# Patient Record
Sex: FEMALE | Race: WHITE | HISPANIC OR LATINO
[De-identification: names, ages, dates, MRNs, and addresses within clinical notes are randomized per-mention and may not be internally consistent; named-entity substitution may affect disease eponyms.]

---

## 2018-02-06 ENCOUNTER — TRANSCRIPTION ENCOUNTER (OUTPATIENT)
Age: 58
End: 2018-02-06

## 2018-02-12 ENCOUNTER — INPATIENT (INPATIENT)
Facility: HOSPITAL | Age: 58
LOS: 3 days | Discharge: HOME | End: 2018-02-16
Attending: INTERNAL MEDICINE | Admitting: INTERNAL MEDICINE
Payer: MEDICAID

## 2018-02-12 VITALS — WEIGHT: 169.98 LBS | HEIGHT: 65 IN

## 2018-02-12 LAB
ADD ON TEST-SPECIMEN IN LAB: SIGNIFICANT CHANGE UP
ANION GAP SERPL CALC-SCNC: 5 MMOL/L — SIGNIFICANT CHANGE UP (ref 5–17)
APTT BLD: 48.7 SEC — HIGH (ref 27.5–37.4)
BASOPHILS # BLD AUTO: 0 K/UL — SIGNIFICANT CHANGE UP (ref 0–0.2)
BASOPHILS NFR BLD AUTO: 0.7 % — SIGNIFICANT CHANGE UP (ref 0–2)
BUN SERPL-MCNC: 12 MG/DL — SIGNIFICANT CHANGE UP (ref 7–23)
CALCIUM SERPL-MCNC: 9.2 MG/DL — SIGNIFICANT CHANGE UP (ref 8.5–10.1)
CHLORIDE SERPL-SCNC: 104 MMOL/L — SIGNIFICANT CHANGE UP (ref 96–108)
CO2 SERPL-SCNC: 29 MMOL/L — SIGNIFICANT CHANGE UP (ref 22–31)
CREAT SERPL-MCNC: 0.91 MG/DL — SIGNIFICANT CHANGE UP (ref 0.5–1.3)
D DIMER BLD IA.RAPID-MCNC: 162 NG/ML DDU — SIGNIFICANT CHANGE UP
EOSINOPHIL # BLD AUTO: 0.1 K/UL — SIGNIFICANT CHANGE UP (ref 0–0.5)
EOSINOPHIL NFR BLD AUTO: 0.9 % — SIGNIFICANT CHANGE UP (ref 0–6)
GLUCOSE SERPL-MCNC: 101 MG/DL — HIGH (ref 70–99)
HCT VFR BLD CALC: 41 % — SIGNIFICANT CHANGE UP (ref 34.5–45)
HGB BLD-MCNC: 14 G/DL — SIGNIFICANT CHANGE UP (ref 11.5–15.5)
INR BLD: 1.35 RATIO — HIGH (ref 0.88–1.16)
LACTATE SERPL-SCNC: 1.3 MMOL/L — SIGNIFICANT CHANGE UP (ref 0.7–2)
LYMPHOCYTES # BLD AUTO: 1.6 K/UL — SIGNIFICANT CHANGE UP (ref 1–3.3)
LYMPHOCYTES # BLD AUTO: 25.6 % — SIGNIFICANT CHANGE UP (ref 13–44)
MCHC RBC-ENTMCNC: 28.2 PG — SIGNIFICANT CHANGE UP (ref 27–34)
MCHC RBC-ENTMCNC: 34.1 GM/DL — SIGNIFICANT CHANGE UP (ref 32–36)
MCV RBC AUTO: 82.7 FL — SIGNIFICANT CHANGE UP (ref 80–100)
MONOCYTES # BLD AUTO: 0.4 K/UL — SIGNIFICANT CHANGE UP (ref 0–0.9)
MONOCYTES NFR BLD AUTO: 5.9 % — SIGNIFICANT CHANGE UP (ref 2–14)
NEUTROPHILS # BLD AUTO: 4.1 K/UL — SIGNIFICANT CHANGE UP (ref 1.8–7.4)
NEUTROPHILS NFR BLD AUTO: 67 % — SIGNIFICANT CHANGE UP (ref 43–77)
PLATELET # BLD AUTO: 311 K/UL — SIGNIFICANT CHANGE UP (ref 150–400)
POTASSIUM SERPL-MCNC: 3.6 MMOL/L — SIGNIFICANT CHANGE UP (ref 3.5–5.3)
POTASSIUM SERPL-SCNC: 3.6 MMOL/L — SIGNIFICANT CHANGE UP (ref 3.5–5.3)
PROTHROM AB SERPL-ACNC: 14.7 SEC — HIGH (ref 9.8–12.7)
RBC # BLD: 4.95 M/UL — SIGNIFICANT CHANGE UP (ref 3.8–5.2)
RBC # FLD: 10.9 % — SIGNIFICANT CHANGE UP (ref 10.3–14.5)
SODIUM SERPL-SCNC: 138 MMOL/L — SIGNIFICANT CHANGE UP (ref 135–145)
WBC # BLD: 6.1 K/UL — SIGNIFICANT CHANGE UP (ref 3.8–10.5)
WBC # FLD AUTO: 6.1 K/UL — SIGNIFICANT CHANGE UP (ref 3.8–10.5)

## 2018-02-12 PROCEDURE — 93010 ELECTROCARDIOGRAM REPORT: CPT

## 2018-02-12 PROCEDURE — 99285 EMERGENCY DEPT VISIT HI MDM: CPT

## 2018-02-12 PROCEDURE — 71046 X-RAY EXAM CHEST 2 VIEWS: CPT | Mod: 26

## 2018-02-12 RX ORDER — SODIUM CHLORIDE 9 MG/ML
1000 INJECTION INTRAMUSCULAR; INTRAVENOUS; SUBCUTANEOUS ONCE
Qty: 0 | Refills: 0 | Status: COMPLETED | OUTPATIENT
Start: 2018-02-12 | End: 2018-02-12

## 2018-02-12 RX ORDER — PIPERACILLIN AND TAZOBACTAM 4; .5 G/20ML; G/20ML
3.38 INJECTION, POWDER, LYOPHILIZED, FOR SOLUTION INTRAVENOUS ONCE
Qty: 0 | Refills: 0 | Status: COMPLETED | OUTPATIENT
Start: 2018-02-12 | End: 2018-02-12

## 2018-02-12 RX ORDER — IPRATROPIUM/ALBUTEROL SULFATE 18-103MCG
3 AEROSOL WITH ADAPTER (GRAM) INHALATION ONCE
Qty: 0 | Refills: 0 | Status: COMPLETED | OUTPATIENT
Start: 2018-02-12 | End: 2018-02-12

## 2018-02-12 RX ORDER — CEFTRIAXONE 500 MG/1
1000 INJECTION, POWDER, FOR SOLUTION INTRAMUSCULAR; INTRAVENOUS EVERY 24 HOURS
Qty: 0 | Refills: 0 | Status: DISCONTINUED | OUTPATIENT
Start: 2018-02-12 | End: 2018-02-16

## 2018-02-12 RX ORDER — MAGNESIUM SULFATE 500 MG/ML
2 VIAL (ML) INJECTION ONCE
Qty: 0 | Refills: 0 | Status: COMPLETED | OUTPATIENT
Start: 2018-02-12 | End: 2018-02-12

## 2018-02-12 RX ORDER — AZITHROMYCIN 500 MG/1
500 TABLET, FILM COATED ORAL EVERY 24 HOURS
Qty: 0 | Refills: 0 | Status: DISCONTINUED | OUTPATIENT
Start: 2018-02-12 | End: 2018-02-16

## 2018-02-12 RX ORDER — CEFTRIAXONE 500 MG/1
1 INJECTION, POWDER, FOR SOLUTION INTRAMUSCULAR; INTRAVENOUS EVERY 24 HOURS
Qty: 0 | Refills: 0 | Status: DISCONTINUED | OUTPATIENT
Start: 2018-02-12 | End: 2018-02-12

## 2018-02-12 RX ORDER — SODIUM CHLORIDE 9 MG/ML
1000 INJECTION INTRAMUSCULAR; INTRAVENOUS; SUBCUTANEOUS
Qty: 0 | Refills: 0 | Status: DISCONTINUED | OUTPATIENT
Start: 2018-02-12 | End: 2018-02-13

## 2018-02-12 RX ORDER — ACETAMINOPHEN 500 MG
650 TABLET ORAL EVERY 6 HOURS
Qty: 0 | Refills: 0 | Status: DISCONTINUED | OUTPATIENT
Start: 2018-02-12 | End: 2018-02-16

## 2018-02-12 RX ORDER — CIPROFLOXACIN LACTATE 400MG/40ML
400 VIAL (ML) INTRAVENOUS ONCE
Qty: 0 | Refills: 0 | Status: COMPLETED | OUTPATIENT
Start: 2018-02-12 | End: 2018-02-12

## 2018-02-12 RX ORDER — ONDANSETRON 8 MG/1
4 TABLET, FILM COATED ORAL EVERY 6 HOURS
Qty: 0 | Refills: 0 | Status: DISCONTINUED | OUTPATIENT
Start: 2018-02-12 | End: 2018-02-16

## 2018-02-12 RX ADMIN — PIPERACILLIN AND TAZOBACTAM 200 GRAM(S): 4; .5 INJECTION, POWDER, LYOPHILIZED, FOR SOLUTION INTRAVENOUS at 20:45

## 2018-02-12 RX ADMIN — Medication 50 GRAM(S): at 22:45

## 2018-02-12 RX ADMIN — Medication 3 MILLILITER(S): at 20:02

## 2018-02-12 RX ADMIN — Medication 3 MILLILITER(S): at 21:40

## 2018-02-12 RX ADMIN — Medication 200 MILLIGRAM(S): at 21:00

## 2018-02-12 RX ADMIN — Medication 125 MILLIGRAM(S): at 21:30

## 2018-02-12 RX ADMIN — SODIUM CHLORIDE 1000 MILLILITER(S): 9 INJECTION INTRAMUSCULAR; INTRAVENOUS; SUBCUTANEOUS at 23:30

## 2018-02-12 RX ADMIN — Medication 3 MILLILITER(S): at 21:30

## 2018-02-12 RX ADMIN — SODIUM CHLORIDE 1000 MILLILITER(S): 9 INJECTION INTRAMUSCULAR; INTRAVENOUS; SUBCUTANEOUS at 20:06

## 2018-02-12 NOTE — H&P ADULT - NSHPPHYSICALEXAM_GEN_ALL_CORE
Vital Signs Last 24 Hrs  T(C): 36.9 (12 Feb 2018 18:36), Max: 36.9 (12 Feb 2018 18:36)  T(F): 98.4 (12 Feb 2018 18:36), Max: 98.4 (12 Feb 2018 18:36)  HR: 101 (12 Feb 2018 22:00) (101 - 106)  BP: 93/44 (12 Feb 2018 22:00) (93/44 - 115/72)  BP(mean): --  RR: 20 (12 Feb 2018 18:36) (20 - 20)  SpO2: 100% (12 Feb 2018 18:36) (100% - 100%)    GEN: appears ill  Neuro: AAOx3, nonfocal  HEENT: NC/AT, EOMI  Neck: no thyroidmegaly, no JVD  Cardiovascular: S1S2 present, regular rhythm, no murmur, tachycardia, BP 93/44  Respiratory: breath sounds normal bilaterally, no wheezing, no rales, no rhonchi  Gastrointestinal: bowel sounds normal, soft, no abdominal tenderness  Musculoskeletal: no muscle tenderness  Extremities: No edema  Skin: No rash Vital Signs Last 24 Hrs  T(C): 36.9 (12 Feb 2018 18:36), Max: 36.9 (12 Feb 2018 18:36)  T(F): 98.4 (12 Feb 2018 18:36), Max: 98.4 (12 Feb 2018 18:36)  HR: 101 (12 Feb 2018 22:00) (101 - 106)  BP: 93/44 (12 Feb 2018 22:00) (93/44 - 115/72)  BP(mean): --  RR: 20 (12 Feb 2018 18:36) (20 - 20)  SpO2: 100% (12 Feb 2018 18:36) (100% - 100%)    GEN: appears ill  Neuro: AAOx3, nonfocal  HEENT: NC/AT, EOMI  Neck: no thyroidmegaly, no JVD  Cardiovascular: S1S2 present, regular rhythm, no murmur, tachycardia, BP 93/44  Respiratory: breath sounds normal bilaterally, no wheezing, no rales, no rhonchi  Gastrointestinal: bowel sounds normal, soft, no abdominal tenderness  Musculoskeletal: no muscle tenderness  Extremities: trace edema  Skin: No rash

## 2018-02-12 NOTE — H&P ADULT - HISTORY OF PRESENT ILLNESS
57 y.o. female with PMH asthma presents with generalized malaise, fever, cough. Pt reports symptoms started 15 days ago where she was initially diagnosed with flu and given tamiflu. Pt reports worsening symptoms and went to urgent care and was told of pneumonia. Pt took levaquin starting 4 days ago and her symptoms didn't improve. Pt reports dry cough and shortness of breath. Reports on and off fever and chills. States her nephew was sick few weeks back. States yesterday, Tmax 100.8. No recent travel.    PMH: as above  PSH: c section, L ovary surgery  Social hx: denies tobacco, EtOH, or drugs  Family hx: Mother-uterine ca; Father-prostate ca; Sister-breast ca  ROS: per hPI

## 2018-02-12 NOTE — ED ADULT TRIAGE NOTE - CHIEF COMPLAINT QUOTE
diagnosed with flu and pna ,was given Levaquin and bromide cough syrup , states medications not working

## 2018-02-12 NOTE — ED STATDOCS - ATTENDING CONTRIBUTION TO CARE
I, Matt Biswas MD,  performed the initial face to face bedside interview with this patient regarding history of present illness, review of symptoms and relevant past medical, social and family history.  I completed an independent physical examination.  I was the initial provider who evaluated this patient. I have signed out the follow up of any pending tests (i.e. labs, radiological studies) to the ACP.  I have communicated the patient’s plan of care and disposition with the ACP.

## 2018-02-12 NOTE — ED STATDOCS - PROGRESS NOTE DETAILS
signed Katja Shearer PA-C Pt seen initially in intake by Dr Biswas. signed Katja Shearer PA-C Pt seen initially in intake by Dr Biswas.  Pt c/o worsening cough and ECHOLS, dx with PNA 1 week ago, started on levaquin. Pt saw PMD 2 weeks ago, was given tamiflu. Pt states she is feeling worse. Plan labs, xray, nebulizer, reassess. possible admission. Pt agrees with plan of  care. signed Katja Shearer PA-C Pt without any improvement after initial neb tx. No change in pulm exam. Pt ill appearing. No significant findings on labwork. Plan additional neb txs, solumedrol, IV magnesium. If no improvement plan admission. Pt agrees with plan of  care. signed Katja Shearer PA-C   Pt not feeling better after second treatments. Plan admission for dypsnea and bronchitis not improving on outpt abx. pt ill-appearing, becomes very dyspneic on exertion, does not feel comfortable going home. Pt agrees with admission and plan of care.   Spoke to Dr rIizarry, accepts pt, requests RVP and D Dimer.

## 2018-02-12 NOTE — ED STATDOCS - OBJECTIVE STATEMENT
56 y/o female with no significant PMHx presents to the ED c/o cough, fever, mild SOB x15 days. Pt was told by PMD that she had the flu and was given Tamiflu 2 weeks ago. On Wednesday (5 days ago) pt was seen at clinic and had an XR which showed pneumonia, was given Levaquin and antitussive medicine. +Posttussive vomiting. +Chills. +Sweats. Pt has not taken anything to treat fevers. 's family is sick, they are living in the same house. Allergic to ASA, ibuprofen. PMD Dr. Armendariz.

## 2018-02-12 NOTE — H&P ADULT - ASSESSMENT
57 y.o. female with PMH asthma presents with generalized malaise, fever, cough. Pt reports symptoms started 15 days ago where she was initially diagnosed with flu and given tamiflu. Pt reports worsening symptoms and went to urgent care and was told of pneumonia. Pt took levaquin starting 4 days ago and her symptoms didn't improve. Pt reports dry cough and shortness of breath. Reports on and off fever and chills. States her nephew was sick few weeks back. States yesterday, Tmax 100.8. No recent travel.    #malaise, fever, tachycardia due to sepsis likely viral infection; bronchitis vs PNA  #hypotension (93/44)  -d dimer noted: 162  -admit to med surg  -iv hydration  -azithro, ceftriaxone  -f/u cultures  -check RVP  -supportive care  -check CT chest w/o    #shortness of breath with hx asthma, exacerbation  -ED documented b/l rhonchi. Currently, clear lungs  -pt received nebs, steroids, magnesium sulfate in ED  -cont steroids  -supplemental oxygen PRN  -prn albuterol  -pulm consult    #DVT ppx  -SCDs

## 2018-02-12 NOTE — ED ADULT NURSE NOTE - OBJECTIVE STATEMENT
Pt seen by PMD and has been taking 2-3 weeks of levaquin and cough medicine, +cough, dry nonproductive "but I still cough even with the cough medicine", reports feeling worse despite PO abx (levaquin). Per  and pt, has confirmed pna seen on CXR output

## 2018-02-13 DIAGNOSIS — J18.9 PNEUMONIA, UNSPECIFIED ORGANISM: ICD-10-CM

## 2018-02-13 DIAGNOSIS — J45.901 UNSPECIFIED ASTHMA WITH (ACUTE) EXACERBATION: ICD-10-CM

## 2018-02-13 DIAGNOSIS — Z87.09 PERSONAL HISTORY OF OTHER DISEASES OF THE RESPIRATORY SYSTEM: ICD-10-CM

## 2018-02-13 LAB
ANION GAP SERPL CALC-SCNC: 10 MMOL/L — SIGNIFICANT CHANGE UP (ref 5–17)
BASOPHILS # BLD AUTO: 0 K/UL — SIGNIFICANT CHANGE UP (ref 0–0.2)
BASOPHILS NFR BLD AUTO: 0.4 % — SIGNIFICANT CHANGE UP (ref 0–2)
BUN SERPL-MCNC: 10 MG/DL — SIGNIFICANT CHANGE UP (ref 7–23)
CALCIUM SERPL-MCNC: 8.5 MG/DL — SIGNIFICANT CHANGE UP (ref 8.5–10.1)
CHLORIDE SERPL-SCNC: 109 MMOL/L — HIGH (ref 96–108)
CO2 SERPL-SCNC: 21 MMOL/L — LOW (ref 22–31)
CREAT SERPL-MCNC: 0.77 MG/DL — SIGNIFICANT CHANGE UP (ref 0.5–1.3)
EOSINOPHIL # BLD AUTO: 0 K/UL — SIGNIFICANT CHANGE UP (ref 0–0.5)
EOSINOPHIL NFR BLD AUTO: 0.1 % — SIGNIFICANT CHANGE UP (ref 0–6)
GLUCOSE SERPL-MCNC: 165 MG/DL — HIGH (ref 70–99)
HCT VFR BLD CALC: 36.6 % — SIGNIFICANT CHANGE UP (ref 34.5–45)
HGB BLD-MCNC: 12.3 G/DL — SIGNIFICANT CHANGE UP (ref 11.5–15.5)
LYMPHOCYTES # BLD AUTO: 0.6 K/UL — LOW (ref 1–3.3)
LYMPHOCYTES # BLD AUTO: 10.3 % — LOW (ref 13–44)
MCHC RBC-ENTMCNC: 28.3 PG — SIGNIFICANT CHANGE UP (ref 27–34)
MCHC RBC-ENTMCNC: 33.5 GM/DL — SIGNIFICANT CHANGE UP (ref 32–36)
MCV RBC AUTO: 84.4 FL — SIGNIFICANT CHANGE UP (ref 80–100)
MONOCYTES # BLD AUTO: 0.1 K/UL — SIGNIFICANT CHANGE UP (ref 0–0.9)
MONOCYTES NFR BLD AUTO: 1.1 % — LOW (ref 2–14)
NEUTROPHILS # BLD AUTO: 4.8 K/UL — SIGNIFICANT CHANGE UP (ref 1.8–7.4)
NEUTROPHILS NFR BLD AUTO: 88.2 % — HIGH (ref 43–77)
PLATELET # BLD AUTO: 255 K/UL — SIGNIFICANT CHANGE UP (ref 150–400)
POTASSIUM SERPL-MCNC: 4 MMOL/L — SIGNIFICANT CHANGE UP (ref 3.5–5.3)
POTASSIUM SERPL-SCNC: 4 MMOL/L — SIGNIFICANT CHANGE UP (ref 3.5–5.3)
RAPID RVP RESULT: SIGNIFICANT CHANGE UP
RBC # BLD: 4.34 M/UL — SIGNIFICANT CHANGE UP (ref 3.8–5.2)
RBC # FLD: 11.5 % — SIGNIFICANT CHANGE UP (ref 10.3–14.5)
SODIUM SERPL-SCNC: 140 MMOL/L — SIGNIFICANT CHANGE UP (ref 135–145)
WBC # BLD: 5.4 K/UL — SIGNIFICANT CHANGE UP (ref 3.8–10.5)
WBC # FLD AUTO: 5.4 K/UL — SIGNIFICANT CHANGE UP (ref 3.8–10.5)

## 2018-02-13 PROCEDURE — 99255 IP/OBS CONSLTJ NEW/EST HI 80: CPT

## 2018-02-13 PROCEDURE — 71250 CT THORAX DX C-: CPT | Mod: 26

## 2018-02-13 RX ORDER — INFLUENZA VIRUS VACCINE 15; 15; 15; 15 UG/.5ML; UG/.5ML; UG/.5ML; UG/.5ML
0.5 SUSPENSION INTRAMUSCULAR ONCE
Qty: 0 | Refills: 0 | Status: COMPLETED | OUTPATIENT
Start: 2018-02-13 | End: 2018-02-16

## 2018-02-13 RX ORDER — ALBUTEROL 90 UG/1
1 AEROSOL, METERED ORAL EVERY 4 HOURS
Qty: 0 | Refills: 0 | Status: DISCONTINUED | OUTPATIENT
Start: 2018-02-13 | End: 2018-02-16

## 2018-02-13 RX ORDER — ALBUTEROL 90 UG/1
2.5 AEROSOL, METERED ORAL EVERY 6 HOURS
Qty: 0 | Refills: 0 | Status: DISCONTINUED | OUTPATIENT
Start: 2018-02-13 | End: 2018-02-16

## 2018-02-13 RX ADMIN — Medication 40 MILLIGRAM(S): at 05:45

## 2018-02-13 RX ADMIN — AZITHROMYCIN 255 MILLIGRAM(S): 500 TABLET, FILM COATED ORAL at 09:44

## 2018-02-13 RX ADMIN — ALBUTEROL 2.5 MILLIGRAM(S): 90 AEROSOL, METERED ORAL at 22:17

## 2018-02-13 RX ADMIN — Medication 40 MILLIGRAM(S): at 21:32

## 2018-02-13 RX ADMIN — CEFTRIAXONE 1000 MILLIGRAM(S): 500 INJECTION, POWDER, FOR SOLUTION INTRAMUSCULAR; INTRAVENOUS at 12:05

## 2018-02-13 RX ADMIN — SODIUM CHLORIDE 125 MILLILITER(S): 9 INJECTION INTRAMUSCULAR; INTRAVENOUS; SUBCUTANEOUS at 06:23

## 2018-02-13 RX ADMIN — Medication 40 MILLIGRAM(S): at 14:10

## 2018-02-13 NOTE — CONSULT NOTE ADULT - ASSESSMENT
Recent Flu.   Pneumonia L lung. CAP. IV Azithro, ceftriaxone. NC O2.   Asthma exacerbation. Improving. Nebs, IV solumedrol, can begin tapering in AM. O2 prn.

## 2018-02-13 NOTE — CONSULT NOTE ADULT - SUBJECTIVE AND OBJECTIVE BOX
HPI:  57 y.o. female with PMH asthma presents with generalized malaise, fever, cough. Pt reports symptoms started 15 days ago where she was initially diagnosed with flu and given tamiflu. Pt reports worsening symptoms and went to urgent care and was told of pneumonia. Pt took levaquin starting 4 days ago and her symptoms didn't improve. Pt reports dry cough and shortness of breath. Reports on and off fever and chills. States her nephew was sick few weeks back. States yesterday, Tmax 100.8. No recent travel.    CT scan of chest reviewed. Shows nodular/patchy findings L lung c/w PNA. Pt on IV azithro, ceftriaxone.      PMH: as above  PSH: c section, L ovary surgery  Social hx: denies tobacco, EtOH, or drugs  Family hx: Mother-uterine ca; Father-prostate ca; Sister-breast ca  ROS: per hPI (12 Feb 2018 23:43)      PAST MEDICAL & SURGICAL HISTORY:  Asthma due to environmental allergies  Lyme disease      MEDICATIONS  (STANDING):  ALBUTerol    90 MICROgram(s) HFA Inhaler 1 Puff(s) Inhalation every 4 hours  azithromycin  IVPB 500 milliGRAM(s) IV Intermittent every 24 hours  cefTRIAXone Injectable. 1000 milliGRAM(s) IV Push every 24 hours  influenza   Vaccine 0.5 milliLiter(s) IntraMuscular once  methylPREDNISolone sodium succinate Injectable 40 milliGRAM(s) IV Push every 8 hours    MEDICATIONS  (PRN):  acetaminophen   Tablet 650 milliGRAM(s) Oral every 6 hours PRN For Temp greater than 38 C (100.4 F), HA, mild pain  ALBUTerol    0.083% 2.5 milliGRAM(s) Nebulizer every 6 hours PRN Shortness of Breath and/or Wheezing  ondansetron Injectable 4 milliGRAM(s) IV Push every 6 hours PRN Nausea      Allergies    aspirin (Swelling (Mild to Mod); Flushing (Mild to Mod))    Intolerances        SOCIAL HISTORY: Denies tobacco, etoh abuse or illicit drug use    FAMILY HISTORY:      Vital Signs Last 24 Hrs  T(C): 37.1 (13 Feb 2018 12:00), Max: 37.2 (12 Feb 2018 23:00)  T(F): 98.7 (13 Feb 2018 12:00), Max: 98.9 (12 Feb 2018 23:00)  HR: 100 (13 Feb 2018 12:00) (88 - 106)  BP: 111/54 (13 Feb 2018 12:00) (91/48 - 126/63)  BP(mean): --  RR: 17 (13 Feb 2018 12:00) (17 - 20)  SpO2: 99% (13 Feb 2018 12:00) (97% - 100%)    REVIEW OF SYSTEMS:    CONSTITUTIONAL:  As per HPI.  HEENT:  Eyes:  No diplopia or blurred vision. ENT:  No earache, sore throat or runny nose.  CARDIOVASCULAR:  No pressure, squeezing, tightness, heaviness or aching about the chest, neck, axilla or epigastrium.  RESPIRATORY:  see above.  GASTROINTESTINAL:  No nausea, vomiting or diarrhea.  GENITOURINARY:  No dysuria, frequency or urgency.  MUSCULOSKELETAL:  As per HPI.  SKIN:  No change in skin, hair or nails.  NEUROLOGIC:  No paresthesias, fasciculations, seizures or weakness.  PSYCHIATRIC:  No disorder of thought or mood.  ENDOCRINE:  No heat or cold intolerance, polyuria or polydipsia.  HEMATOLOGICAL:  No easy bruising or bleedings:  .     PHYSICAL EXAMINATION:    GENERAL APPEARANCE:  Pt. is not currently dyspneic, in no distress. Pt. is alert, oriented, and pleasant.  HEENT:  Pupils are normal and react normally. No icterus. Mucous membranes well colored.  NECK:  Supple. No lymphadenopathy. Jugular venous pressure not elevated. Carotids equal.   HEART:   The cardiac impulse has a normal quality. Regular. Normal S1 and S2. There are no murmurs, rubs or gallops noted  CHEST:  Some decreased, but present, BS's aud bilat.   ABDOMEN:  Soft and nontender.   EXTREMITIES:  There is no cyanosis, clubbing or edema.   SKIN:  No rash or significant lesions are noted.  Neuro: Alert, awake, and O x 3.      LABS:                        12.3   5.4   )-----------( 255      ( 13 Feb 2018 06:32 )             36.6     02-13    140  |  109<H>  |  10  ----------------------------<  165<H>  4.0   |  21<L>  |  0.77    Ca    8.5      13 Feb 2018 06:32        PT/INR - ( 12 Feb 2018 19:59 )   PT: 14.7 sec;   INR: 1.35 ratio         PTT - ( 12 Feb 2018 19:59 )  PTT:48.7 sec            RADIOLOGY & ADDITIONAL STUDIES:

## 2018-02-13 NOTE — ED ADULT NURSE REASSESSMENT NOTE - NS ED NURSE REASSESS COMMENT FT1
Patient received from VIANNEY Forde. Patient resting comfortably in bed. Safety and comfort maintained. Will continue to monitor.

## 2018-02-14 RX ORDER — CODEINE PHOSPHATE/GUAIFENESIN
5 SYRUP ORAL EVERY 6 HOURS
Qty: 0 | Refills: 0 | Status: DISCONTINUED | OUTPATIENT
Start: 2018-02-14 | End: 2018-02-16

## 2018-02-14 RX ORDER — CODEINE SULFATE 60 MG/1
15 TABLET ORAL EVERY 6 HOURS
Qty: 0 | Refills: 0 | Status: DISCONTINUED | OUTPATIENT
Start: 2018-02-14 | End: 2018-02-14

## 2018-02-14 RX ADMIN — CEFTRIAXONE 1000 MILLIGRAM(S): 500 INJECTION, POWDER, FOR SOLUTION INTRAMUSCULAR; INTRAVENOUS at 12:58

## 2018-02-14 RX ADMIN — AZITHROMYCIN 255 MILLIGRAM(S): 500 TABLET, FILM COATED ORAL at 12:58

## 2018-02-14 RX ADMIN — Medication 40 MILLIGRAM(S): at 18:39

## 2018-02-14 RX ADMIN — ALBUTEROL 2.5 MILLIGRAM(S): 90 AEROSOL, METERED ORAL at 13:42

## 2018-02-14 RX ADMIN — Medication 40 MILLIGRAM(S): at 05:51

## 2018-02-14 RX ADMIN — Medication 100 MILLIGRAM(S): at 21:54

## 2018-02-15 RX ORDER — LACTOBACILLUS ACIDOPHILUS 100MM CELL
1 CAPSULE ORAL
Qty: 0 | Refills: 0 | Status: DISCONTINUED | OUTPATIENT
Start: 2018-02-15 | End: 2018-02-16

## 2018-02-15 RX ADMIN — CEFTRIAXONE 1000 MILLIGRAM(S): 500 INJECTION, POWDER, FOR SOLUTION INTRAMUSCULAR; INTRAVENOUS at 11:23

## 2018-02-15 RX ADMIN — Medication 40 MILLIGRAM(S): at 05:15

## 2018-02-15 RX ADMIN — Medication 100 MILLIGRAM(S): at 11:23

## 2018-02-15 RX ADMIN — Medication 1 TABLET(S): at 17:45

## 2018-02-15 RX ADMIN — AZITHROMYCIN 255 MILLIGRAM(S): 500 TABLET, FILM COATED ORAL at 10:06

## 2018-02-15 RX ADMIN — Medication 100 MILLIGRAM(S): at 17:45

## 2018-02-15 RX ADMIN — Medication 100 MILLIGRAM(S): at 05:15

## 2018-02-15 NOTE — PROGRESS NOTE ADULT - ASSESSMENT
57 y.o. female with PMH asthma presents with generalized malaise, fever, cough. Pt reports symptoms started 15 days ago where she was initially diagnosed with flu and given tamiflu. Pt reports worsening symptoms and went to urgent care and was told of pneumonia. Pt took levaquin starting 4 days ago and her symptoms didn't improve. Pt reports dry cough and shortness of breath. Reports on and off fever and chills. States her nephew was sick few weeks back. States yesterday, Tmax 100.8. No recent travel.    #malaise, fever, tachycardia due PNA  - rvp neg  - CT Chest : BRANDON PNA  -admit to med surg  - cw azithro, ceftriaxone  -f/u cultures  -supportive care  - pulm appreicated    #shortness of breath with hx asthma, exacerbation  -pt received nebs, steroids, magnesium sulfate in ED  -cont steroids- taper  -supplemental oxygen PRN  -prn albuterol    #DVT ppx  -SCDs
57 y.o. female with PMH asthma presents with generalized malaise, fever, cough. Pt reports symptoms started 15 days ago where she was initially diagnosed with flu and given tamiflu. Pt reports worsening symptoms and went to urgent care and was told of pneumonia. Pt took levaquin starting 4 days ago and her symptoms didn't improve. Pt reports dry cough and shortness of breath. Reports on and off fever and chills. States her nephew was sick few weeks back. States yesterday, Tmax 100.8. No recent travel.    #malaise, fever, tachycardia due PNA  - rvp neg  - CT Chest : BRANDON PNA  -admit to med surg  - cw azithro, ceftriaxone  -f/u cultures  -supportive care  - pulm appreicated    #shortness of breath with hx asthma, exacerbation  -pt received nebs, steroids, magnesium sulfate in ED  -cont steroids- taper  -supplemental oxygen PRN  -prn albuterol    #DVT ppx  -SCDs
57 y.o. female with PMH asthma presents with generalized malaise, fever, cough. Pt reports symptoms started 15 days ago where she was initially diagnosed with flu and given tamiflu. Pt reports worsening symptoms and went to urgent care and was told of pneumonia. Pt took levaquin starting 4 days ago and her symptoms didn't improve. Pt reports dry cough and shortness of breath. Reports on and off fever and chills. States her nephew was sick few weeks back. States yesterday, Tmax 100.8. No recent travel.    #malaise, fever, tachycardia due PNA  - rvp neg  - CT Chest : BRANDON PNA  -admit to med surg  - cw azithro, ceftriaxone  -f/u cultures  -supportive care  - pulm appreicated    #shortness of breath with hx asthma, exacerbation  -pt received nebs, steroids, magnesium sulfate in ED  -cont steroids- taper  -supplemental oxygen PRN  -prn albuterol    #DVT ppx  -SCDs    Dispo: Likely discharge tomorrow

## 2018-02-15 NOTE — PROGRESS NOTE ADULT - SUBJECTIVE AND OBJECTIVE BOX
CHIEF COMPLAINT: malaise, fever, cough    SUBJECTIVE: + dry cough. Not sob at rest presently. No chest pain.    REVIEW OF SYSTEMS: All other review of systems is negative unless indicated above    Vital Signs Last 24 Hrs  T(C): 37.1 (13 Feb 2018 12:00), Max: 37.2 (12 Feb 2018 23:00)  T(F): 98.7 (13 Feb 2018 12:00), Max: 98.9 (12 Feb 2018 23:00)  HR: 100 (13 Feb 2018 12:00) (88 - 102)  BP: 111/54 (13 Feb 2018 12:00) (91/48 - 126/63)  BP(mean): --  RR: 17 (13 Feb 2018 12:00) (17 - 19)  SpO2: 99% (13 Feb 2018 12:00) (97% - 99%)    PHYSICAL EXAM:  	GEN: appears ill  	Neuro: AAOx3, nonfocal  	HEENT: NC/AT, EOMI  	Neck: no thyroidmegaly, no JVD  	Cardiovascular: S1S2 present, regular rhythm, no murmur, tachycardia  	Respiratory: diminshed breath sounds diffusely. no wheezing, rales or rhonchi  	Gastrointestinal: bowel sounds normal, soft, no abdominal tenderness  	Musculoskeletal: no muscle tenderness  	Extremities: trace edema              Skin: No rash    MEDICATIONS:  MEDICATIONS  (STANDING):  ALBUTerol    90 MICROgram(s) HFA Inhaler 1 Puff(s) Inhalation every 4 hours  azithromycin  IVPB 500 milliGRAM(s) IV Intermittent every 24 hours  cefTRIAXone Injectable. 1000 milliGRAM(s) IV Push every 24 hours  influenza   Vaccine 0.5 milliLiter(s) IntraMuscular once  methylPREDNISolone sodium succinate Injectable 40 milliGRAM(s) IV Push every 8 hours    LABS: All Labs Reviewed:                        12.3   5.4   )-----------( 255      ( 13 Feb 2018 06:32 )             36.6     140  |  109<H>  |  10  ----------------------------<  165<H>  4.0   |  21<L>  |  0.77    Ca    8.5      13 Feb 2018 06:32    PT/INR - ( 12 Feb 2018 19:59 )   PT: 14.7 sec;   INR: 1.35 ratio    PTT - ( 12 Feb 2018 19:59 )  PTT:48.7 sec    < from: CT Chest No Cont (02.13.18 @ 00:43) >  IMPRESSION: Left upper lobe pneumonia.
CHIEF COMPLAINT: malaise, fever, cough    SUBJECTIVE: + dry cough. Not sob at rest presently. No chest pain.    REVIEW OF SYSTEMS: All other review of systems is negative unless indicated above    Vital Signs Last 24 Hrs  T(C): 37.2 (14 Feb 2018 13:12), Max: 37.2 (14 Feb 2018 13:12)  T(F): 98.9 (14 Feb 2018 13:12), Max: 98.9 (14 Feb 2018 13:12)  HR: 80 (14 Feb 2018 13:40) (63 - 88)  BP: 110/56 (14 Feb 2018 13:12) (99/51 - 110/56)  BP(mean): --  RR: 18 (14 Feb 2018 13:12) (18 - 18)  SpO2: 97% (14 Feb 2018 13:12) (93% - 99%)    PHYSICAL EXAM:  Constitutional: NAD, awake and alert  HEENT: EOMI, Normal Hearing, MMM  Neck: Soft and supple, No JVD  Respiratory: CTABL  Cardiovascular: S1 and S2, regular rate and rhythm, no Murmurs, gallops or rubs  Gastrointestinal: Bowel Sounds present, soft, nontender, nondistended, no guarding, no rebound  Extremities: No peripheral edema  Vascular: 2+ peripheral pulses  Neurological: A/O x 3, no focal deficits  Musculoskeletal: 5/5 strength b/l upper and lower extremities  Skin: No rashes    MEDICATIONS:  MEDICATIONS  (STANDING):  ALBUTerol    90 MICROgram(s) HFA Inhaler 1 Puff(s) Inhalation every 4 hours  azithromycin  IVPB 500 milliGRAM(s) IV Intermittent every 24 hours  cefTRIAXone Injectable. 1000 milliGRAM(s) IV Push every 24 hours  influenza   Vaccine 0.5 milliLiter(s) IntraMuscular once  methylPREDNISolone sodium succinate Injectable 40 milliGRAM(s) IV Push once    LABS: All Labs Reviewed:                        12.3   5.4   )-----------( 255      ( 13 Feb 2018 06:32 )             36.6     140  |  109<H>  |  10  ----------------------------<  165<H>  4.0   |  21<L>  |  0.77    Ca    8.5      13 Feb 2018 06:32    PT/INR - ( 12 Feb 2018 19:59 )   PT: 14.7 sec;   INR: 1.35 ratio    PTT - ( 12 Feb 2018 19:59 )  PTT:48.7 sec    Blood Culture: 02-12 @ 19:59  Organism --  Gram Stain Blood -- Gram Stain --  Specimen Source .Blood None  Culture-Blood --
CHIEF COMPLAINT: malaise, fever, cough    SUBJECTIVE: + dry cough. Not sob at rest presently. No chest pain. Does not feel ready to go home.    REVIEW OF SYSTEMS: All other review of systems is negative unless indicated above    Vital Signs Last 24 Hrs  T(C): 37.5 (15 Feb 2018 14:29), Max: 37.5 (15 Feb 2018 14:29)  T(F): 99.5 (15 Feb 2018 14:29), Max: 99.5 (15 Feb 2018 14:29)  HR: 85 (15 Feb 2018 14:29) (57 - 85)  BP: 109/63 (15 Feb 2018 14:29) (106/45 - 113/59)  BP(mean): --  RR: 18 (15 Feb 2018 14:29) (17 - 18)  SpO2: 99% (15 Feb 2018 14:29) (96% - 99%)    PHYSICAL EXAM:  Constitutional: NAD, awake and alert  HEENT: EOMI, Normal Hearing, MMM  Neck: Soft and supple, No JVD  Respiratory: CTABL  Cardiovascular: S1 and S2, regular rate and rhythm, no Murmurs, gallops or rubs  Gastrointestinal: Bowel Sounds present, soft, nontender, nondistended, no guarding, no rebound  Extremities: No peripheral edema  Vascular: 2+ peripheral pulses  Neurological: A/O x 3, no focal deficits  Musculoskeletal: 5/5 strength b/l upper and lower extremities  Skin: No rashes    MEDICATIONS:  MEDICATIONS  (STANDING):  ALBUTerol    90 MICROgram(s) HFA Inhaler 1 Puff(s) Inhalation every 4 hours  azithromycin  IVPB 500 milliGRAM(s) IV Intermittent every 24 hours  cefTRIAXone Injectable. 1000 milliGRAM(s) IV Push every 24 hours  influenza   Vaccine 0.5 milliLiter(s) IntraMuscular once  lactobacillus acidophilus 1 Tablet(s) Oral two times a day with meals  predniSONE   Tablet 40 milliGRAM(s) Oral daily    LABS: All Labs Reviewed:    Blood Culture: 02-12 @ 19:59  Organism --  Gram Stain Blood -- Gram Stain --  Specimen Source .Blood None  Culture-Blood --

## 2018-02-16 ENCOUNTER — TRANSCRIPTION ENCOUNTER (OUTPATIENT)
Age: 58
End: 2018-02-16

## 2018-02-16 VITALS
SYSTOLIC BLOOD PRESSURE: 110 MMHG | OXYGEN SATURATION: 98 % | RESPIRATION RATE: 18 BRPM | HEART RATE: 92 BPM | TEMPERATURE: 99 F | DIASTOLIC BLOOD PRESSURE: 64 MMHG

## 2018-02-16 RX ORDER — AZITHROMYCIN 500 MG/1
1 TABLET, FILM COATED ORAL
Qty: 2 | Refills: 0 | OUTPATIENT
Start: 2018-02-16 | End: 2018-02-17

## 2018-02-16 RX ORDER — LACTOBACILLUS ACIDOPHILUS 100MM CELL
1 CAPSULE ORAL
Qty: 14 | Refills: 0 | OUTPATIENT
Start: 2018-02-16 | End: 2018-02-22

## 2018-02-16 RX ORDER — FLUTICASONE PROPIONATE 50 MCG
1 SPRAY, SUSPENSION NASAL
Qty: 1 | Refills: 0 | OUTPATIENT
Start: 2018-02-16

## 2018-02-16 RX ORDER — ALBUTEROL 90 UG/1
3 AEROSOL, METERED ORAL
Qty: 30 | Refills: 0 | OUTPATIENT
Start: 2018-02-16

## 2018-02-16 RX ORDER — CEFUROXIME AXETIL 250 MG
2 TABLET ORAL
Qty: 20 | Refills: 0 | OUTPATIENT
Start: 2018-02-16 | End: 2018-02-20

## 2018-02-16 RX ADMIN — Medication 40 MILLIGRAM(S): at 05:12

## 2018-02-16 RX ADMIN — Medication 100 MILLIGRAM(S): at 05:15

## 2018-02-16 RX ADMIN — Medication 1 TABLET(S): at 09:40

## 2018-02-16 RX ADMIN — AZITHROMYCIN 255 MILLIGRAM(S): 500 TABLET, FILM COATED ORAL at 09:40

## 2018-02-16 RX ADMIN — CEFTRIAXONE 1000 MILLIGRAM(S): 500 INJECTION, POWDER, FOR SOLUTION INTRAMUSCULAR; INTRAVENOUS at 14:01

## 2018-02-16 RX ADMIN — INFLUENZA VIRUS VACCINE 0.5 MILLILITER(S): 15; 15; 15; 15 SUSPENSION INTRAMUSCULAR at 14:01

## 2018-02-16 NOTE — DISCHARGE NOTE ADULT - PLAN OF CARE
resolution complete course of antibiotics complete course of steroids  continue inhaler/nebulizer  follow up with your primary doctor

## 2018-02-16 NOTE — DISCHARGE NOTE ADULT - CARE PLAN
Principal Discharge DX:	Pneumonia  Goal:	resolution  Assessment and plan of treatment:	complete course of antibiotics  Secondary Diagnosis:	Asthma exacerbation  Assessment and plan of treatment:	complete course of steroids  continue inhaler/nebulizer  follow up with your primary doctor

## 2018-02-16 NOTE — DISCHARGE NOTE ADULT - PATIENT PORTAL LINK FT
You can access the [email or web address] Patient Portal, offered by [facility], by registering with the following website: [email or web address]

## 2018-02-16 NOTE — DISCHARGE NOTE ADULT - MEDICATION SUMMARY - MEDICATIONS TO TAKE
I will START or STAY ON the medications listed below when I get home from the hospital:    predniSONE 20 mg oral tablet  -- 2 tab(s) by mouth once a day  -- Indication: For Asthma     benzonatate 100 mg oral capsule  -- 1 cap(s) by mouth every 6 hours, As needed, Cough  -- Indication: For cough    albuterol 2.5 mg/3 mL (0.083%) inhalation solution  -- 3 milliliter(s) inhaled every 6 hours, As Needed   -- Indication: For Asthma    Ceftin 250 mg oral tablet  -- 2 tab(s) by mouth every 12 hours   -- Finish all this medication unless otherwise directed by prescriber.  Medication should be taken with plenty of water.  Take with food or milk.    -- Indication: For Pneumonia    azithromycin 500 mg oral tablet  -- 1 tab(s) by mouth once a day   -- Do not take dairy products, antacids, or iron preparations within one hour of this medication.  Finish all this medication unless otherwise directed by prescriber.    -- Indication: For Pneumonia    Flonase 50 mcg/inh nasal spray  -- 1 spray(s) intranasally 2 times a day, As Needed   -- For the nose.  It is very important that you take or use this exactly as directed.  Do not skip doses or discontinue unless directed by your doctor.    -- Indication: For sinusitis    lactobacillus acidophilus oral capsule  -- 1 cap(s) by mouth 2 times a day   -- Indication: For luz

## 2018-02-16 NOTE — DISCHARGE NOTE ADULT - HOSPITAL COURSE
CHIEF COMPLAINT: malaise, fever, cough  SUBJECTIVE: + dry cough. Not sob at rest presently. No chest pain.   REVIEW OF SYSTEMS: All other review of systems is negative unless indicated above    Vital Signs Last 24 Hrs  T(C): 37.1 (16 Feb 2018 14:27), Max: 37.1 (16 Feb 2018 14:27)  T(F): 98.8 (16 Feb 2018 14:27), Max: 98.8 (16 Feb 2018 14:27)  HR: 92 (16 Feb 2018 14:27) (69 - 92)  BP: 110/64 (16 Feb 2018 14:27) (90/74 - 112/43)  BP(mean): --  RR: 18 (16 Feb 2018 14:27) (18 - 18)  SpO2: 98% (16 Feb 2018 14:27) (97% - 98%)    PHYSICAL EXAM:  Constitutional: NAD, awake and alert  HEENT: EOMI, Normal Hearing, MMM  Neck: Soft and supple, No JVD  Respiratory: CTABL  Cardiovascular: S1 and S2, regular rate and rhythm, no Murmurs, gallops or rubs  Gastrointestinal: Bowel Sounds present, soft, nontender, nondistended, no guarding, no rebound  Extremities: No peripheral edema  Vascular: 2+ peripheral pulses  Neurological: A/O x 3, no focal deficits  Musculoskeletal: 5/5 strength b/l upper and lower extremities  Skin: No rashes    A/P  57 y.o. female with PMH asthma presents with generalized malaise, fever, cough. Pt reports symptoms started 15 days ago where she was initially diagnosed with flu and given tamiflu. Pt reports worsening symptoms and went to urgent care and was told of pneumonia. Pt took levaquin starting 4 days ago and her symptoms didn't improve. Pt reports dry cough and shortness of breath. Reports on and off fever and chills. States her nephew was sick few weeks back. States yesterday, Tmax 100.8. No recent travel.    #malaise, fever, tachycardia due PNA  - rvp neg  - CT Chest : BRANDON PNA  - azithro, ceftriaxone transitioned to ceftin and azithro at dc to complete course  - pulm appreicated    #shortness of breath with hx asthma, exacerbation  -pt received nebs, steroids, magnesium sulfate in ED  -complete steroid course  -prn albuterol  - follow up pmd    time spent on discharge 40 minutes

## 2018-02-18 LAB
CULTURE RESULTS: SIGNIFICANT CHANGE UP
CULTURE RESULTS: SIGNIFICANT CHANGE UP
SPECIMEN SOURCE: SIGNIFICANT CHANGE UP
SPECIMEN SOURCE: SIGNIFICANT CHANGE UP

## 2018-02-23 DIAGNOSIS — J18.9 PNEUMONIA, UNSPECIFIED ORGANISM: ICD-10-CM

## 2018-02-23 DIAGNOSIS — J45.901 UNSPECIFIED ASTHMA WITH (ACUTE) EXACERBATION: ICD-10-CM

## 2018-02-23 DIAGNOSIS — R00.0 TACHYCARDIA, UNSPECIFIED: ICD-10-CM

## 2018-02-26 ENCOUNTER — TRANSCRIPTION ENCOUNTER (OUTPATIENT)
Age: 58
End: 2018-02-26

## 2018-05-14 NOTE — ED STATDOCS - CHPI ED SYMPTOM POS
Progress Note - Urmila Adams 79 y o  female MRN: [id number]    Unit/Bed#: -01 Encounter: [id number]            Jojo Fong MD         acetaminophen    albuterol    bisacodyl    dextromethorphalbertoFENesin    ABDOMINAL DISTENTION/FEVER/+Mild SOB +Cough +Posttussive vomiting +Chills +Sweats 

## 2018-12-12 PROBLEM — J45.909 UNSPECIFIED ASTHMA, UNCOMPLICATED: Chronic | Status: ACTIVE | Noted: 2018-02-13

## 2018-12-12 PROBLEM — A69.20 LYME DISEASE, UNSPECIFIED: Chronic | Status: ACTIVE | Noted: 2018-02-13

## 2018-12-17 ENCOUNTER — APPOINTMENT (OUTPATIENT)
Dept: ORTHOPEDIC SURGERY | Facility: CLINIC | Age: 58
End: 2018-12-17
Payer: COMMERCIAL

## 2018-12-17 PROCEDURE — 99204 OFFICE O/P NEW MOD 45 MIN: CPT | Mod: 25

## 2018-12-17 PROCEDURE — 20610 DRAIN/INJ JOINT/BURSA W/O US: CPT | Mod: LT

## 2018-12-26 ENCOUNTER — FORM ENCOUNTER (OUTPATIENT)
Age: 58
End: 2018-12-26

## 2018-12-27 ENCOUNTER — OUTPATIENT (OUTPATIENT)
Dept: OUTPATIENT SERVICES | Facility: HOSPITAL | Age: 58
LOS: 1 days | End: 2018-12-27
Payer: COMMERCIAL

## 2018-12-27 ENCOUNTER — APPOINTMENT (OUTPATIENT)
Dept: MRI IMAGING | Facility: CLINIC | Age: 58
End: 2018-12-27
Payer: COMMERCIAL

## 2018-12-27 DIAGNOSIS — Z00.8 ENCOUNTER FOR OTHER GENERAL EXAMINATION: ICD-10-CM

## 2018-12-27 PROCEDURE — 73721 MRI JNT OF LWR EXTRE W/O DYE: CPT | Mod: 26,LT

## 2018-12-27 PROCEDURE — 73721 MRI JNT OF LWR EXTRE W/O DYE: CPT

## 2019-01-08 ENCOUNTER — APPOINTMENT (OUTPATIENT)
Dept: ORTHOPEDIC SURGERY | Facility: CLINIC | Age: 59
End: 2019-01-08
Payer: COMMERCIAL

## 2019-01-08 DIAGNOSIS — Z78.9 OTHER SPECIFIED HEALTH STATUS: ICD-10-CM

## 2019-01-08 DIAGNOSIS — M25.562 PAIN IN LEFT KNEE: ICD-10-CM

## 2019-01-08 DIAGNOSIS — Z87.39 PERSONAL HISTORY OF OTHER DISEASES OF THE MUSCULOSKELETAL SYSTEM AND CONNECTIVE TISSUE: ICD-10-CM

## 2019-01-08 DIAGNOSIS — Z87.09 PERSONAL HISTORY OF OTHER DISEASES OF THE RESPIRATORY SYSTEM: ICD-10-CM

## 2019-01-08 PROCEDURE — 99214 OFFICE O/P EST MOD 30 MIN: CPT

## 2019-01-08 NOTE — DISCUSSION/SUMMARY
[de-identified] : Assessment: Left knee medial meniscal tear with arthritis.\par \par Plan:\par She is feeling good today. She would like to go back to work. We gave her a work note to return to work tomorrow. We reviewed the MRI and answered all her questions. If the knee continues to bother her she will follow up in office and we'll discuss arthroscopy. For right now she is doing great and the cortisone injection helped her.  She can followup on an as-needed basis and we gave her a note to return to work no restrictions/full duty.

## 2019-01-08 NOTE — HISTORY OF PRESENT ILLNESS
[All Other ROS Normal] : All other review of systems are negative except as noted [Joint Pain] : joint pain [FreeTextEntry1] : Left knee pain. [FreeTextEntry2] : Ca returns to the office to review the MRI of her left knee. Since the cortisone injection last visit her pain scale has significantly dropped and she would like to go back to work after today's visit.  She denies locking or catching of the left knee. She denies numbness and tingling going down the left lower extremity. Her pain scale today and all this is a 2/10 which is significantly better than the last office visit.

## 2019-01-08 NOTE — PHYSICAL EXAM
[FreeTextEntry2] : Laterality: Left knee\par \par General: Alert and oriented x3.  In no acute distress.  Pleasant in nature with a normal affect.  No apparent respiratory distress. \par \par Erythema, Warmth, Rubor: Negative\par Swelling/Edema: Negative \par ROM: 0-120 degrees\par Lucila's Test: Positive\par Medial Joint Line TTP: Positive\par Lateral Joint Line TTP: Negative\par Lachman Exam/Anterior Drawer/Pivot Shift Test: Negative \par MCL Pain: Negative\par LCL Pain: Negative\par Iliotibial Band Pain: Negative\par Patellofemoral Joint Pain: Negative\par Pes Anserinus TTP: Negative\par Homans Sign: Negative\par Neurovascularly: Intact with no sensory or motor deficits\par  [de-identified] : MRI is positive for a medial meniscal tear, a proximal medial gastrocnemius tear, osteoarthritis.

## 2020-09-02 ENCOUNTER — TRANSCRIPTION ENCOUNTER (OUTPATIENT)
Age: 60
End: 2020-09-02

## 2021-09-20 ENCOUNTER — EMERGENCY (EMERGENCY)
Facility: HOSPITAL | Age: 61
LOS: 0 days | Discharge: HOME | End: 2021-09-20
Attending: EMERGENCY MEDICINE
Payer: OTHER MISCELLANEOUS

## 2021-09-20 VITALS
DIASTOLIC BLOOD PRESSURE: 61 MMHG | OXYGEN SATURATION: 100 % | TEMPERATURE: 98 F | HEART RATE: 75 BPM | RESPIRATION RATE: 18 BRPM | SYSTOLIC BLOOD PRESSURE: 120 MMHG

## 2021-09-20 VITALS
OXYGEN SATURATION: 98 % | HEART RATE: 63 BPM | RESPIRATION RATE: 18 BRPM | TEMPERATURE: 98 F | SYSTOLIC BLOOD PRESSURE: 116 MMHG | DIASTOLIC BLOOD PRESSURE: 53 MMHG

## 2021-09-20 DIAGNOSIS — S52.121A DISPLACED FRACTURE OF HEAD OF RIGHT RADIUS, INITIAL ENCOUNTER FOR CLOSED FRACTURE: ICD-10-CM

## 2021-09-20 DIAGNOSIS — Z88.6 ALLERGY STATUS TO ANALGESIC AGENT: ICD-10-CM
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
## 2021-09-20 DIAGNOSIS — J45.909 UNSPECIFIED ASTHMA, UNCOMPLICATED: ICD-10-CM

## 2021-09-20 DIAGNOSIS — S42.401A UNSPECIFIED FRACTURE OF LOWER END OF RIGHT HUMERUS, INITIAL ENCOUNTER FOR CLOSED FRACTURE: ICD-10-CM
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
## 2021-09-20 DIAGNOSIS — M25.531 PAIN IN RIGHT WRIST: ICD-10-CM

## 2021-09-20 PROCEDURE — 73200 CT UPPER EXTREMITY W/O DYE: CPT | Mod: RT

## 2021-09-20 PROCEDURE — 99285 EMERGENCY DEPT VISIT HI MDM: CPT

## 2021-09-20 PROCEDURE — 99284 EMERGENCY DEPT VISIT MOD MDM: CPT | Mod: 25

## 2021-09-20 PROCEDURE — 29105 APPLICATION LONG ARM SPLINT: CPT | Mod: RT

## 2021-09-20 PROCEDURE — 73080 X-RAY EXAM OF ELBOW: CPT | Mod: 26,RT,76

## 2021-09-20 PROCEDURE — 72170 X-RAY EXAM OF PELVIS: CPT

## 2021-09-20 PROCEDURE — 73200 CT UPPER EXTREMITY W/O DYE: CPT | Mod: 26,RT,MA

## 2021-09-20 PROCEDURE — 76376 3D RENDER W/INTRP POSTPROCES: CPT

## 2021-09-20 PROCEDURE — 76376 3D RENDER W/INTRP POSTPROCES: CPT | Mod: 26

## 2021-09-20 PROCEDURE — 72170 X-RAY EXAM OF PELVIS: CPT | Mod: 26

## 2021-09-20 PROCEDURE — 99242 OFF/OP CONSLTJ NEW/EST SF 20: CPT | Mod: 25

## 2021-09-20 PROCEDURE — 29105 APPLICATION LONG ARM SPLINT: CPT

## 2021-09-20 PROCEDURE — 73060 X-RAY EXAM OF HUMERUS: CPT | Mod: 26,RT

## 2021-09-20 PROCEDURE — 73060 X-RAY EXAM OF HUMERUS: CPT | Mod: RT

## 2021-09-20 PROCEDURE — 73080 X-RAY EXAM OF ELBOW: CPT | Mod: RT

## 2021-09-20 RX ORDER — MORPHINE SULFATE 50 MG/1
4 CAPSULE, EXTENDED RELEASE ORAL ONCE
Refills: 0 | Status: DISCONTINUED | OUTPATIENT
Start: 2021-09-20 | End: 2021-09-20

## 2021-09-20 RX ORDER — OXYCODONE AND ACETAMINOPHEN 5; 325 MG/1; MG/1
1 TABLET ORAL ONCE
Refills: 0 | Status: DISCONTINUED | OUTPATIENT
Start: 2021-09-20 | End: 2021-09-20

## 2021-09-20 RX ADMIN — OXYCODONE AND ACETAMINOPHEN 1 TABLET(S): 5; 325 TABLET ORAL at 15:09

## 2021-09-20 RX ADMIN — MORPHINE SULFATE 4 MILLIGRAM(S): 50 CAPSULE, EXTENDED RELEASE ORAL at 12:22

## 2021-09-20 RX ADMIN — MORPHINE SULFATE 4 MILLIGRAM(S): 50 CAPSULE, EXTENDED RELEASE ORAL at 12:07

## 2021-09-20 NOTE — ED STATDOCS - WR INTERPRETATION 1
Radial head Fx and Fx at coronoid process of ulna.  ? Fx at medial epicondyle.  NELSON fitzgerald PA-C

## 2021-09-20 NOTE — ED ADULT NURSE NOTE - OBJECTIVE STATEMENT
Pt tripped and fall after taking a picture at [employer] , pt reports right arm pain , shoulder and elbow , denies head injury

## 2021-09-20 NOTE — ED STATDOCS - NSFOLLOWUPINSTRUCTIONS_ED_ALL_ED_FT
ELBOW FRACTURE - AfterCare(R) Instructions(ER/ED)           Fractura de codo    LO QUE NECESITA SABER:    Abdulaziz fractura de codo es abdulaziz fractura de alexandria o más de los 3 huesos que danyell la articulación del codo. La osteoporosis (huesos quebradizos) también puede aumentar amaro riesgo de abdulaziz fractura de codo.    Huesos del brazo del adulto         INSTRUCCIONES SOBRE EL DAYANARA HOSPITALARIA:    Regrese a la marcella de emergencias si:  •Amaro piel se encuentra inflamada, fría o pálida.      •Amaro codo, mano o dedos están entumecidos.      Llame a amaro médico si:  •Tiene fiebre.      •El dolor empeora, aún después de descansar y serafin sydnee medicamentos para el dolor.      •Usted tiene dificultad reciente o mayor para  el brazo.      •Usted tiene colby nuevos alrededor del área de la abrazadera, la férula o el yeso.      •La abrazadera, la férula o el yeso se dañan.      •Usted tiene preguntas o inquietudes acerca de amaro condición o cuidado.      Medicamentos:Es posible que usted necesite alguno de los siguientes:   •Puede administrarsepodrían administrarse. Pregunte al médico cómo debe serafin vanesa medicamento de forma ling. Algunos medicamentos recetados para el dolor contienen acetaminofén. No tome otros medicamentos que contengan acetaminofén sin consultarlo con amaro médico. Demasiado acetaminofeno puede causar daño al hígado. Los medicamentos recetados para el dolor podrían causar estreñimiento. Pregunte a amaro médico phoebe prevenir o tratar estreñimiento.      •Los MAYTE,phoebe el ibuprofeno, ayudan a disminuir la inflamación, el dolor y la fiebre. Vanesa medicamento está disponible con o sin abdulaziz receta médica. Los MAYTE pueden causar sangrado estomacal o problemas renales en ciertas personas. Si usted zoë un medicamento anticoagulante, siempre pregúntele a amaro médico si los MAYTE son seguros para usted. Siempre garo la etiqueta de vanesa medicamento y siga las instrucciones.      •Anna sydnee medicamentos phoebe se le haya indicado.Consulte con amaro médico si usted isael que amaro medicamento no le está ayudando o si presenta efectos secundarios. Infórmele si es alérgico a cualquier medicamento. Mantenga abdulaziz lista actualizada de los medicamentos, las vitaminas y los productos herbales que zoë. Incluya los siguientes datos de los medicamentos: cantidad, frecuencia y motivo de administración. Traiga con usted la lista o los envases de las píldoras a sydnee citas de seguimiento. Lleve la lista de los medicamentos con usted en luis de abdulaziz emergencia.      Cuidados personales:  •Eleve amaro codopor encima del nivel del corazón con la frecuencia que pueda. [place] va a disminuir inflamación y el dolor. Apoye amaro codo sobre almohadas o mantas para mantenerlo elevado cómodamente. Mientras amaro codo esté elevado, mueva los dedos, ábralos y ciérrelos para evitar rigidez en la mano.             •Aplique hieloen el codo de 15 a 20 minutos cada hora o según las indicaciones. Use abdulaziz compresa de hielo o ponga hielo triturado en abdulaziz bolsa de plástico. Cúbrala con abdulaziz toalla. El hielo ayuda a evitar daño al tejido y a disminuir la inflamación y el dolor.      •Vaya a terapia física según indicaciones.Un fisioterapeuta puede enseñarle ejercicios para mejorar el movimiento y la fuerza y para aliviar el dolor.      Cuide amaro aparato ortopédico, yeso o férula.Siga las instrucciones acerca de cuándo podría bañarse o ducharse. Es importante que no se moje amaro aparato ortopédico, yeso o férula. Cubra amaro dispositivo con abdulaziz bolsa de plástico antes de bañarse. Use cinta adhesiva para pegar la bolsa en amaro piel por encima del dispositivo para ayudar a mantener el agua fuera. Mantenga amaro codo lejos del agua en luis de que la bolsa se rompa.  •Revise todos los días la piel alrededor del yeso, la abrazadera o la férula para iggy si está enrojecida o abierta.      •No use objetos afilados o punzantes para rascarse la piel debajo del yeso, la abrazadera o la férula.      •No remueva amaro aparato ortopédico o férula a menos que se lo indiquen.      Acuda a la consulta de control con amaro médico según las indicaciones:Puede que usted necesite ir a iggy un especialista. Es probable que necesite más radiografías y tratamiento. Anote sydnee preguntas para que se acuerde de hacerlas olena sydnee visitas.       © Copyright [employer] 2021           back to top                          © Copyright [employer] 2021

## 2021-09-20 NOTE — ED ADULT NURSE NOTE - CHIEF COMPLAINT QUOTE
Pt tripped and fall after taking a picture at [employer] , pt reports right arm pain , shoulder and elbow , denies head injury

## 2021-09-20 NOTE — CONSULT NOTE ADULT - SUBJECTIVE AND OBJECTIVE BOX
HPI: Patient is a 61y Female with chief complaint Right elbow pain. 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 She had immediate pain with no alleviating factors. No other injury. No HS or LOC. Dr Schwab on call was contacted and request we evaluate the pt, place a posterior slab and obtain a bony CT of th elbow  Pt seen and examined by Orthopedic Team: Residents and PAs.      PAST MEDICAL & SURGICAL HISTORY:  Lyme disease    Asthma due to environmental allergies        Allergies: aspirin (Swelling (Mild to Mod); Flushing (Mild to Mod))  aspirin (Unknown)      Medications:     Social History:     Ambulation: Baseline Ambulation  independent         Imaging: Per HPI. CT pending    PHYSICAL EXAM:    Vital Signs Last 24 Hrs  T(C): 36.9 (20 Sep 2021 10:41), Max: 36.9 (20 Sep 2021 10:41)  T(F): 98.4 (20 Sep 2021 10:41), Max: 98.4 (20 Sep 2021 10:41)  HR: 75 (20 Sep 2021 10:41) (75 - 75)  BP: 120/61 (20 Sep 2021 10:41) (120/61 - 120/61)  BP(mean): 76 (20 Sep 2021 10:41) (76 - 76)  RR: 18 (20 Sep 2021 10:41) (18 - 18)  SpO2: 100% (20 Sep 2021 10:41) (100% - 100%)    Physical Exam:  General: NAD, Alert, Awake and oriented  Neurologic: No gross deficits, moving all 4s.  Head: NCAT without abrasions, lacerations, or ecchymosis to head, face, or scalp  Eyes: PERRL  Neck/C-Spine: No bony TTP, FAROM  T/L Spine: No bony TTP, no palpable step-off.  Respiratory: Nonlabored. Symmetric chest wall expansion bilaterally, no accessory muscle use  Abdomen: Soft, Nontender, no guarding.  Musculoskeletal:      RIGHT UE: No open skin. + swelling about the Right elbow with limited ROM due to pain. No other signs of trauma at shoulder, upper arm, wrist or hand/digits.  Wrist extension and flexion, AIN PIN intact. + radial pulse palpable with brisk cap refill at distal finger tips.     LEFT UE: No open skin. No obvious deformities or other signs of trauma at shoulder, upper arm, elbow, forearm, wrist or hand/digits.  Full baseline painless AROM at shoulder, elbow, wrist, and . No bony TTP.     HIPS and PELVIS: Pelvis stable to AP and Lat compression. Able to actively SLR bilaterally. Negative Log Roll, Negative Heel strike bilaterally. No pain on internal/external rotation of the hips.    RIGHT LE: Full baseline painless ROM at hip, knee, ankle and toes with negative log-roll and heel strike.     LEFT LE:  Full baseline painless ROM at hip, knee, ankle and toe with negative log-roll and heel strike.          HPI: Patient is a 61y Female with chief complaint Right elbow pain. 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 She had immediate pain with no alleviating factors. No other injury. No HS or LOC. She banged her hip but was able to ambulate after the fall and in the ED. Xray elbow appear to show fxs of radial head and coronoid. No obvious hip fx on XR of hip/pelvis. Dr Schwab on call was contacted and request we evaluate the pt, place a posterior slab and obtain a bony CT of th elbow. Pt is Right handed.  Pt seen and examined by Orthopedic Team: Residents and PAs.      PAST MEDICAL & SURGICAL HISTORY:  Lyme disease    Asthma due to environmental allergies        Allergies: aspirin (Swelling (Mild to Mod); Flushing (Mild to Mod))  aspirin (Unknown)      Medications:     Social History:     Ambulation: Baseline Ambulation  independent         Imaging: Per HPI. CT pending      PHYSICAL EXAM:    Vital Signs Last 24 Hrs  T(C): 36.9 (20 Sep 2021 10:41), Max: 36.9 (20 Sep 2021 10:41)  T(F): 98.4 (20 Sep 2021 10:41), Max: 98.4 (20 Sep 2021 10:41)  HR: 75 (20 Sep 2021 10:41) (75 - 75)  BP: 120/61 (20 Sep 2021 10:41) (120/61 - 120/61)  BP(mean): 76 (20 Sep 2021 10:41) (76 - 76)  RR: 18 (20 Sep 2021 10:41) (18 - 18)  SpO2: 100% (20 Sep 2021 10:41) (100% - 100%)    Physical Exam:  General: NAD, Alert, Awake and oriented  Neurologic: No gross deficits, moving all 4s.  Head: NCAT without abrasions, lacerations, or ecchymosis to head, face, or scalp  Eyes: PERRL  Neck/C-Spine: No bony TTP, FAROM  T/L Spine: No bony TTP, no palpable step-off.  Respiratory: Nonlabored. Symmetric chest wall expansion bilaterally, no accessory muscle use  Abdomen: Soft, Nontender, no guarding.  Musculoskeletal:      RIGHT UE: No open skin. + swelling about the Right elbow with limited ROM due to pain. No other signs of trauma at shoulder, upper arm, wrist or hand/digits.  Wrist extension and flexion, AIN PIN intact. + radial pulse palpable with brisk cap refill at distal finger tips.     LEFT UE: No open skin. No obvious deformities or other signs of trauma at shoulder, upper arm, elbow, forearm, wrist or hand/digits.  Full baseline painless AROM at shoulder, elbow, wrist, and . No bony TTP.     HIPS and PELVIS: Pelvis stable to AP and Lat compression. Able to actively SLR bilaterally. Negative Log Roll, Negative Heel strike bilaterally. No pain on internal/external rotation of the hips.    RIGHT LE: Full baseline painless ROM at hip, knee, ankle and toes with negative log-roll and heel strike.     LEFT LE:  Full baseline painless ROM at hip, knee, ankle and toe with negative log-roll and heel strike.          HPI: Patient is a 61y Female with chief complaint Right elbow pain. 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 She had immediate pain with no alleviating factors. No other injury. No HS or LOC. She banged her hip but was able to ambulate after the fall and in the ED. Xray elbow appear to show fxs of radial head and coronoid. No obvious hip fx on XR of pelvis. Dr Schwab on call was contacted and request we evaluate the pt, place a posterior slab and obtain a bony CT of th elbow. Pt is Right handed.  Pt seen and examined by Orthopedic Team: Residents and PAs.      PAST MEDICAL & SURGICAL HISTORY:  Lyme disease    Asthma due to environmental allergies        Allergies: aspirin (Swelling (Mild to Mod); Flushing (Mild to Mod))  aspirin (Unknown)      Medications:     Social History:     Ambulation: Baseline Ambulation  independent         Imaging: Per HPI. CT pending      PHYSICAL EXAM:    Vital Signs Last 24 Hrs  T(C): 36.9 (20 Sep 2021 10:41), Max: 36.9 (20 Sep 2021 10:41)  T(F): 98.4 (20 Sep 2021 10:41), Max: 98.4 (20 Sep 2021 10:41)  HR: 75 (20 Sep 2021 10:41) (75 - 75)  BP: 120/61 (20 Sep 2021 10:41) (120/61 - 120/61)  BP(mean): 76 (20 Sep 2021 10:41) (76 - 76)  RR: 18 (20 Sep 2021 10:41) (18 - 18)  SpO2: 100% (20 Sep 2021 10:41) (100% - 100%)    Physical Exam:  General: NAD, Alert, Awake and oriented  Neurologic: No gross deficits, moving all 4s.  Head: NCAT without abrasions, lacerations, or ecchymosis to head, face, or scalp  Eyes: PERRL  Neck/C-Spine: No bony TTP, FAROM  T/L Spine: No bony TTP, no palpable step-off.  Respiratory: Nonlabored. Symmetric chest wall expansion bilaterally, no accessory muscle use  Abdomen: Soft, Nontender, no guarding.  Musculoskeletal:      RIGHT UE: No open skin. + swelling about the Right elbow with limited ROM due to pain. No other signs of trauma at shoulder, upper arm, wrist or hand/digits.  Wrist extension and flexion, AIN PIN intact. + radial pulse palpable with brisk cap refill at distal finger tips.     LEFT UE: No open skin. No obvious deformities or other signs of trauma at shoulder, upper arm, elbow, forearm, wrist or hand/digits.  Full baseline painless AROM at shoulder, elbow, wrist, and . No bony TTP.     HIPS and PELVIS: Pelvis stable to AP and Lat compression. Able to actively SLR bilaterally. Negative Log Roll, Negative Heel strike bilaterally. No pain on internal/external rotation of the hips.    RIGHT LE: Full baseline painless ROM at hip, knee, ankle and toes with negative log-roll and heel strike.     LEFT LE:  Full baseline painless ROM at hip, knee, ankle and toe with negative log-roll and heel strike.

## 2021-09-20 NOTE — ED STATDOCS - PROGRESS NOTE DETAILS
60 y/o Female, R hand dominant, presents to ED c/o pain in R elbow and R hip / groin s/p fall off platform that was approx 1 foot high.  Pt landed on R side.  Neg LOC, (+) Nausea and blurry vision initially after fall.  Subsided now.  Neg HA, vomiting.  Pt with severe pain to R elbow area.  Unable to move R elbow.  NT R hand / wrist / forearm / Shoulder to palp.  2+ radial pulses.  Strength 4/5 in R finger with .  Will re-asses after pain control.  NT LS spine.  NT R ankle, calf, Knee, thigh.  Tender to R pelvis with rock.   Will re-eval s/p Xrays.  Eulalia Mayorga PA-C Pelvis Xray was neg for Fx.  Elbow and humerus Xrays showed Fx Radial head and ? coronoid process of ulna.  ? Medial epicondyle fx of humerus.  Dr. Hairston and Ortho was consulted.  CT was performed.  Posterior slab was placed and sling applied by Ortho.  Will dc home with Tylenol and Percocet for breakthrough pain as pt is allergic to ASA and Nsaids (swelling).  Phone number given for Dr. vickers's coordinator for appt.  Eulalia Mayorga PA-C

## 2021-09-20 NOTE — ED STATDOCS - PATIENT PORTAL LINK FT
You can access the FollowMyHealth Patient Portal offered by [facility] by registering at the following website: [email or web address]. By joining [employer]’s FollowMyHealth portal, you will also be able to view your health information using other applications (apps) compatible with our system.

## 2021-09-20 NOTE — ED STATDOCS - OBJECTIVE STATEMENT
62 y/o female with a PMHx of Asthma, Lyme disease, presents to the ED c/o R arm pain and R hip pain s/p fall at 9am today. 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 Allergic to aspirin. 60 y/o female with a PMHx of Asthma, Lyme disease, presents to the ED c/o R arm pain and R hip pain s/p fall at 9am today. 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 No LOC. Allergic to aspirin. No other complaints.

## 2021-09-20 NOTE — ED ADULT TRIAGE NOTE - CHIEF COMPLAINT QUOTE
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

## 2021-09-20 NOTE — ED STATDOCS - WR ORDER NAME 1
Recommend ENT follow-up.  Will treat with antibiotics.  Flonase.  Stop smoking.  ER precautions were given.   Xray Elbow AP + Lateral, Right

## 2021-09-20 NOTE — ED STATDOCS - MUSCULOSKELETAL, MLM
TTP R elbow and R distal humerus, no deformity of wrist or hand, no tenderness of R shoulder. no neck or back tenderness. +R pelvic tenderness. hips non tender, FROM.

## 2021-09-20 NOTE — CONSULT NOTE ADULT - ASSESSMENT
Orthopedic A/P:    Pt is a  61y Female with     Procedure: Posterior long arm splint well padded to RUE    -FU CT  -Analgesia  -The acute care for her injury was discussed with the patient and pertinent Questions answered  -Pt understands possible need for surgery which she will discuss in further detail int  office w Dr Schwab  -She understands she needs to follow up in about 3-5days which was recommended and emphasized  -If she exhibits any signs of compartment (discussed) she will return to ED  -Discussed with orthopedic Attending Dr Schwab  -Pt may be DC'd after CT  -Plan relayed to ED provider/PA   Orthopedic A/P:    Pt is a  61y Female with     Procedure: Posterior long arm splint well padded to RUE    -FU CT  -Analgesia  -The acute care for her injury was discussed with the patient and pertinent Questions answered  -Pt understands possible need for surgery which she will discuss in further detail in the office w Dr Schwab  -She understands she needs to follow up in about 3-5days which was recommended and emphasized  -If she exhibits any signs of compartment (discussed) she will return to ED  -Discussed with orthopedic Attending Dr Schwab  -Pt may be DC'd after CT  -Plan relayed to ED provider/PA

## 2021-10-13 ENCOUNTER — TRANSCRIPTION ENCOUNTER (OUTPATIENT)
Age: 61
End: 2021-10-13

## 2021-11-16 ENCOUNTER — TRANSCRIPTION ENCOUNTER (OUTPATIENT)
Age: 61
End: 2021-11-16

## 2022-10-31 ENCOUNTER — FORM ENCOUNTER (OUTPATIENT)
Age: 62
End: 2022-10-31

## 2022-11-06 ENCOUNTER — FORM ENCOUNTER (OUTPATIENT)
Age: 62
End: 2022-11-06

## 2022-11-10 ENCOUNTER — FORM ENCOUNTER (OUTPATIENT)
Age: 62
End: 2022-11-10

## 2022-11-13 ENCOUNTER — FORM ENCOUNTER (OUTPATIENT)
Age: 62
End: 2022-11-13

## 2022-12-05 ENCOUNTER — FORM ENCOUNTER (OUTPATIENT)
Age: 62
End: 2022-12-05

## 2022-12-06 VITALS
SYSTOLIC BLOOD PRESSURE: 116 MMHG | DIASTOLIC BLOOD PRESSURE: 78 MMHG | WEIGHT: 160 LBS | HEIGHT: 61 IN | TEMPERATURE: 96.4 F | BODY MASS INDEX: 30.21 KG/M2

## 2022-12-29 ENCOUNTER — FORM ENCOUNTER (OUTPATIENT)
Age: 62
End: 2022-12-29

## 2023-01-03 ENCOUNTER — FORM ENCOUNTER (OUTPATIENT)
Age: 63
End: 2023-01-03

## 2023-03-09 ENCOUNTER — NON-APPOINTMENT (OUTPATIENT)
Age: 63
End: 2023-03-09

## 2023-04-18 NOTE — ED ADULT TRIAGE NOTE - HEIGHT IN INCHES
Chief Complaint   Patient presents with    Hypertension    COPD    Nicotine Dependence    Vitamin D Deficiency   1. Have you been to the ER, urgent care clinic since your last visit? Hospitalized since your last visit?no    2. Have you seen or consulted any other health care providers outside of the [address] [place] [address] since your last visit? Include any pap smears or colon screening.  no 5

## 2023-07-11 ENCOUNTER — APPOINTMENT (OUTPATIENT)
Dept: NEPHROLOGY | Facility: CLINIC | Age: 63
End: 2023-07-11